# Patient Record
Sex: FEMALE | Race: WHITE
[De-identification: names, ages, dates, MRNs, and addresses within clinical notes are randomized per-mention and may not be internally consistent; named-entity substitution may affect disease eponyms.]

---

## 2017-01-06 ENCOUNTER — HOSPITAL ENCOUNTER (EMERGENCY)
Dept: HOSPITAL 45 - C.EDB | Age: 22
Discharge: HOME | End: 2017-01-06
Payer: COMMERCIAL

## 2017-01-06 VITALS
BODY MASS INDEX: 22.85 KG/M2 | BODY MASS INDEX: 22.85 KG/M2 | WEIGHT: 142.2 LBS | WEIGHT: 142.2 LBS | HEIGHT: 65.98 IN | HEIGHT: 65.98 IN

## 2017-01-06 VITALS — SYSTOLIC BLOOD PRESSURE: 120 MMHG | DIASTOLIC BLOOD PRESSURE: 73 MMHG | HEART RATE: 76 BPM | OXYGEN SATURATION: 100 %

## 2017-01-06 VITALS — TEMPERATURE: 98.42 F

## 2017-01-06 DIAGNOSIS — Z87.820: ICD-10-CM

## 2017-01-06 DIAGNOSIS — K12.1: Primary | ICD-10-CM

## 2017-01-06 DIAGNOSIS — J45.909: ICD-10-CM

## 2017-01-06 NOTE — EMERGENCY ROOM VISIT NOTE
History


First contact with patient:  17:52


Chief Complaint:  SKIN PROBLEM


Stated Complaint:  PAINFUL ULCERS ALL AROUND MOUTH, TROUBLE EATING





History of Present Illness


The patient is a 21 year old female who presents to the Emergency Room with 

complaints of painful ulcers in her mouth.  The patient reports that she first 

developed painful ulcerations in her mouth earlier last year.  She was seen 

here at that time and prescribed Valtrex, which completely resolved her 

symptoms at that time.  She reports the symptoms returned a few weeks ago.  She 

was seen here at that time and was prescribed Valtrex again.  She states she 

has been taking this but the sores have continued.  She states that they are 

located throughout the mouth.  She rates her discomfort a 9/10.  She reports 

she is unable to eat due to the severe pain.  Since developing the ulcers, the 

patient has also been seen by 3 different providers at her primary care provider

's office, a walk-in clinic, a dentist and an oral surgeon.  She states that 

her primary care provider most recently referred her to a rheumatologist.  The 

patient has a prescription for hydrocodone that she takes for headaches and has 

been taking this without relief.  She denies any fevers, difficulty swallowing, 

abdominal pain, nausea or vomiting.





Review of Systems


A complete 10-point Review of Systems was discussed with the patient, with 

pertinent positives and negatives listed in the History of Present Illness. All 

remaining Review of Systems questions can be considered negative unless 

otherwise specified.





Past Medical/Surgical History


Medical Problems:


(1) Asthma


(2) Esophageal Reflux


(3) Traumatic brain injury








Family History





Cancer


Diabetes mellitus


Heart disease


Hypertension


Lung disease





Social History


Smoking Status:  Never Smoker


Alcohol Use:  none


Drug Use:  none


Marital Status:  single


Housing Status:  lives with family


Occupation Status:  employed





Current/Historical Medications


Scheduled


Birth Control Pills (Birth Control Pills), 1 TAB PO DAILY


Valacyclovir (Valtrex), 500 MG PO TID





Scheduled PRN


Hydrocodone/Acetaminophen 10MG/325MG (Norco 10MG/325MG), 1 TAB PO BID PRN for 

Pain


Methocarbamol (Methocarbamol), 500 MG PO BID PRN for Muscle Spasms


Naproxen (Aleve), 220 MG PO BID PRN for Pain





Allergies


Coded Allergies:  


     Amoxicillin (Unverified  Allergy, Unknown, UNKNOWN, 12/19/16)


     Penicillins (Verified  Allergy, Unknown, 12/19/16)


Uncoded Allergies:  


     MAGIC MOUTHWASH (Adverse Reaction, Severe, TONGUE AND LIPS SWELLING, 1/6/17

)





Physical Exam


Vital Signs











  Date Time  Temp Pulse Resp B/P Pulse Ox O2 Delivery O2 Flow Rate FiO2


 


1/6/17 18:50  76 20 120/73 100   


 


1/6/17 17:40 36.9 113 20 139/96 98 Room Air  











Physical Exam


VITALS: Vitals are noted on the nurse's note and reviewed by myself.  Vital 

signs stable.


GENERAL: This is a 21-year-old female, in no acute distress, nondiaphoretic, 

well-developed well-nourished.


SKIN: Capillary reflex less than 2 seconds.


MOUTH: There are multiple small ulcerations of the buccal mucosa.  There is one 

small ulceration under the tongue.  They have mild surrounding erythema.  There 

is no drainage or swelling.


NECK: Supple without nuchal rigidity.  No lymphadenopathy.


NEURO: Patient was alert and oriented to person place and time.





Medical Decision & Procedures


Medical Decision


Differential diagnosis includes HSV, aphthous ulcers, hand foot mouth disease, 

among others.





The patient was evaluated as above.  She does have ulcerations within the mouth 

which appear to be most consistent with HSV or possibly aphthous ulcers.  The 

patient has seen 9 different providers for these ulcerations.  She has had 

cultures done which were negative.  The patient has been prescribed Valtrex, 

prednisone and viscous lidocaine.  The patient already received prescriptions 

for hydrocodone from her neurologist and has received 60 tablets of hydrocodone 

last month and 24 tablets of oxycodone last month.  I do not feel that there is 

any other treatment that I can provide at this time.  I did discuss with the 

patient and her mother the importance of continuity of care with the primary 

care provider.  The patient verbalized understanding and was discharged home in 

good condition.





Impression





 Primary Impression:  Mouth ulcers





Departure Information


Dispostion


Home / Self-Care





Condition


GOOD





Referrals


Jose Huynh D[facility] (PCP)





Patient Instructions


[facility], [facility] [employer]





Additional Instructions





Follow-up with your primary care provider.





Continue medications as prescribed.





For pain control, you can use the following over-the-counter medicines (if >11 yo):





- Regular strength (325mg/tab) Tylenol (acetaminophen) 2 tabs every 4-6 hours 

as needed. Do not exceed 12 tablets in a 24 hour period. Avoid taking more than 

4 grams (4000 mg) of Tylenol per day. This includes any other sources of 

acetaminophen you may take on a regular basis.





- Regular strength (200 mg/tab) Advil (ibuprofen) 1-2 tabs every 4-6 hours as 

needed. Do not exceed a dose of 3200 mg per day.

## 2017-01-10 ENCOUNTER — HOSPITAL ENCOUNTER (OUTPATIENT)
Dept: HOSPITAL 45 - C.PAPS | Age: 22
Discharge: HOME | End: 2017-01-10
Attending: OBSTETRICS & GYNECOLOGY
Payer: COMMERCIAL

## 2017-01-10 DIAGNOSIS — Z01.419: Primary | ICD-10-CM

## 2017-09-20 ENCOUNTER — HOSPITAL ENCOUNTER (OUTPATIENT)
Dept: HOSPITAL 45 - C.RADBC | Age: 22
Discharge: HOME | End: 2017-09-20
Attending: PHYSICIAN ASSISTANT
Payer: COMMERCIAL

## 2017-09-20 DIAGNOSIS — R05: Primary | ICD-10-CM

## 2017-09-20 NOTE — DIAGNOSTIC IMAGING REPORT
CHEST 2 VIEWS ROUTINE



CLINICAL HISTORY: R05 Cough dyspnea



COMPARISON STUDY:  No previous studies for comparison.



FINDINGS: The bones soft tissues and hemidiaphragms are normal. The

cardiomediastinal silhouette is normal. The lungs are clear. The pulmonary

vasculature is normal. 



IMPRESSION:  Negative chest. 











The above report was generated using voice recognition software.  It may contain

grammatical, syntax or spelling errors.









Electronically signed by:  Jaime Prakash M.D.

9/20/2017 3:39 PM



Dictated Date/Time:  9/20/2017 3:39 PM

## 2018-02-19 ENCOUNTER — HOSPITAL ENCOUNTER (OUTPATIENT)
Dept: HOSPITAL 45 - C.LABSPEC | Age: 23
Discharge: HOME | End: 2018-02-19
Attending: OBSTETRICS & GYNECOLOGY
Payer: COMMERCIAL

## 2018-02-19 ENCOUNTER — HOSPITAL ENCOUNTER (OUTPATIENT)
Dept: HOSPITAL 45 - C.PAPS | Age: 23
Discharge: HOME | End: 2018-02-19
Attending: OBSTETRICS & GYNECOLOGY
Payer: COMMERCIAL

## 2018-02-19 DIAGNOSIS — Z11.3: Primary | ICD-10-CM

## 2018-02-19 DIAGNOSIS — Z12.4: Primary | ICD-10-CM
